# Patient Record
Sex: MALE | URBAN - METROPOLITAN AREA
[De-identification: names, ages, dates, MRNs, and addresses within clinical notes are randomized per-mention and may not be internally consistent; named-entity substitution may affect disease eponyms.]

---

## 2024-09-27 ENCOUNTER — OFFICE VISIT (OUTPATIENT)
Dept: URBAN - METROPOLITAN AREA CLINIC 70 | Facility: CLINIC | Age: 44
End: 2024-09-27

## 2024-10-03 ENCOUNTER — OFFICE VISIT (OUTPATIENT)
Dept: URBAN - METROPOLITAN AREA CLINIC 70 | Facility: CLINIC | Age: 44
End: 2024-10-03
Payer: COMMERCIAL

## 2024-10-03 ENCOUNTER — LAB OUTSIDE AN ENCOUNTER (OUTPATIENT)
Dept: URBAN - METROPOLITAN AREA CLINIC 70 | Facility: CLINIC | Age: 44
End: 2024-10-03

## 2024-10-03 ENCOUNTER — DASHBOARD ENCOUNTERS (OUTPATIENT)
Age: 44
End: 2024-10-03

## 2024-10-03 VITALS
HEIGHT: 74 IN | DIASTOLIC BLOOD PRESSURE: 76 MMHG | BODY MASS INDEX: 33.62 KG/M2 | WEIGHT: 262 LBS | SYSTOLIC BLOOD PRESSURE: 122 MMHG | HEART RATE: 76 BPM | TEMPERATURE: 98.1 F

## 2024-10-03 DIAGNOSIS — Z12.11 COLON CANCER SCREENING: ICD-10-CM

## 2024-10-03 PROCEDURE — 99202 OFFICE O/P NEW SF 15 MIN: CPT | Performed by: NURSE PRACTITIONER

## 2024-10-03 RX ORDER — ATORVASTATIN CALCIUM 20 MG/1
TAKE 1 TABLET BY MOUTH EVERY DAY TABLET, FILM COATED ORAL
Qty: 90 EACH | Refills: 0 | Status: ACTIVE | COMMUNITY

## 2024-10-03 RX ORDER — INSULIN ASPART 100 [IU]/ML
AS DIRECTED INJECTION, SOLUTION INTRAVENOUS; SUBCUTANEOUS
Status: ACTIVE | COMMUNITY

## 2024-10-03 RX ORDER — INSULIN GLARGINE 100 [IU]/ML
INJECT 20 UNITS SUBCUTANEOUSLY EVERY DAY INJECTION, SOLUTION SUBCUTANEOUS
Qty: 15 MILLILITER | Refills: 0 | Status: ACTIVE | COMMUNITY

## 2024-10-03 RX ORDER — DICLOFENAC SODIUM 75 MG/1
TAKE 1 TABLET BY MOUTH EVERY 12 HOURS AS DIRECTED FOR 30 DAYS TABLET, DELAYED RELEASE ORAL
Qty: 60 EACH | Refills: 0 | Status: ACTIVE | COMMUNITY

## 2024-10-03 RX ORDER — OXYCODONE AND ACETAMINOPHEN 7.5; 325 MG/1; MG/1
TAKE 1 TABLET BY MOUTH EVERY 6 HOURS AS NEEDED TABLET ORAL
Qty: 120 EACH | Refills: 0 | Status: ACTIVE | COMMUNITY

## 2024-10-03 RX ORDER — KRILL/OM-3/DHA/EPA/PHOSPHO/AST 1000-230MG
1 TABLET CAPSULE ORAL ONCE A DAY
Qty: 30 | Status: ACTIVE | COMMUNITY
Start: 2024-10-03 | End: 2024-11-02

## 2024-10-03 RX ORDER — TIRZEPATIDE 2.5 MG/.5ML
AS DIRECTED INJECTION, SOLUTION SUBCUTANEOUS
Status: ON HOLD | COMMUNITY

## 2024-10-03 RX ORDER — BUSPIRONE HYDROCHLORIDE 7.5 MG/1
TAKE 1 TABLET BY MOUTH TWICE A DAY TABLET ORAL
Qty: 180 EACH | Refills: 1 | Status: ACTIVE | COMMUNITY

## 2024-10-03 NOTE — HPI-TODAY'S VISIT:
Patient is a 43 y/o male who presents today for a screening colonoscopy. No prior colonoscopy. No Fhx of colon cancer. No current GI complaints. Denies abdominal pain, wt loss, constipation, diarrhea, or rectal bleeding.

## 2024-10-03 NOTE — PHYSICAL EXAM MUSCULOSKELETAL:
normal gait and station , full range of motion , no tenderness or deformities present <<-----Click on this checkbox to enter Post-Op Dx

## 2024-10-18 ENCOUNTER — OFFICE VISIT (OUTPATIENT)
Dept: URBAN - METROPOLITAN AREA SURGERY CENTER 24 | Facility: SURGERY CENTER | Age: 44
End: 2024-10-18

## 2024-11-01 ENCOUNTER — OFFICE VISIT (OUTPATIENT)
Dept: URBAN - METROPOLITAN AREA SURGERY CENTER 24 | Facility: SURGERY CENTER | Age: 44
End: 2024-11-01

## 2024-11-25 ENCOUNTER — OFFICE VISIT (OUTPATIENT)
Dept: URBAN - METROPOLITAN AREA SURGERY CENTER 24 | Facility: SURGERY CENTER | Age: 44
End: 2024-11-25